# Patient Record
Sex: FEMALE | HISPANIC OR LATINO | URBAN - METROPOLITAN AREA
[De-identification: names, ages, dates, MRNs, and addresses within clinical notes are randomized per-mention and may not be internally consistent; named-entity substitution may affect disease eponyms.]

---

## 2021-12-29 ENCOUNTER — APPOINTMENT (RX ONLY)
Dept: URBAN - METROPOLITAN AREA CLINIC 27 | Facility: CLINIC | Age: 66
Setting detail: DERMATOLOGY
End: 2021-12-29

## 2021-12-29 PROBLEM — L50.1 IDIOPATHIC URTICARIA: Status: ACTIVE | Noted: 2021-12-29

## 2021-12-29 PROCEDURE — 99204 OFFICE O/P NEW MOD 45 MIN: CPT

## 2021-12-29 PROCEDURE — ? ADDITIONAL NOTES

## 2021-12-29 PROCEDURE — ? COUNSELING

## 2021-12-29 PROCEDURE — ? TREATMENT REGIMEN

## 2021-12-29 PROCEDURE — ? ORDER TESTS

## 2021-12-29 PROCEDURE — ? LAB REPORTS REVIEWED

## 2021-12-29 NOTE — PROCEDURE: MIPS QUALITY
Quality 431: Preventive Care And Screening: Unhealthy Alcohol Use - Screening: Patient not identified as an unhealthy alcohol user when screened for unhealthy alcohol use using a systematic screening method
Detail Level: Generalized
Quality 111:Pneumonia Vaccination Status For Older Adults: Pneumococcal Vaccination Previously Received
Quality 402: Tobacco Use And Help With Quitting Among Adolescents: Patient screened for tobacco and never smoked
Quality 130: Documentation Of Current Medications In The Medical Record: Current Medications Documented

## 2021-12-29 NOTE — PROCEDURE: ADDITIONAL NOTES
Render Risk Assessment In Note?: no
Additional Notes: Discussed urticaria and causes at length. Patient reports previous history of episodic urticaria in childhood and later in her 30’s, however those episodes only lasted weeks-months, current symptoms have been present for 5 years. Will check blood work and trial higher dose antihistamine.
Detail Level: Detailed

## 2021-12-29 NOTE — HPI: ITCHING
What Type Of Note Output Would You Prefer (Optional)?: Standard Output
On A Scale Of 0 To 10 How Would You Rate Your Itching?: 8
How Did Your Itching Occur?: gradual in onset  (over a period of years)
How Severe Is Your Itching?: moderate
Additional History: Pt reports pruritis is worse after showers and when clothing items rubs against her skin. This issue has been going on for five years. Pt reports she has seen multiple doctors and was prescribed antihistamines (Claritin and Allegra) and reports her itching is the same.

## 2021-12-29 NOTE — PROCEDURE: TREATMENT REGIMEN
Detail Level: Simple
Initiate Treatment: Allegra 180 mg tabs- take 1 tab BID, if no change in symptoms after 4-5 days, increase to TID. May increase to QID if still no change after 4-5 days.

## 2021-12-29 NOTE — PROCEDURE: ORDER TESTS
Performing Laboratory: [id number]
Billing Type: Third-Party Bill
Expected Date Of Service: 12/29/2021
Bill For Surgical Tray: no

## 2022-01-18 ENCOUNTER — APPOINTMENT (RX ONLY)
Dept: URBAN - METROPOLITAN AREA CLINIC 27 | Facility: CLINIC | Age: 67
Setting detail: DERMATOLOGY
End: 2022-01-18

## 2022-01-18 DIAGNOSIS — L71.8 OTHER ROSACEA: ICD-10-CM

## 2022-01-18 PROBLEM — L50.1 IDIOPATHIC URTICARIA: Status: ACTIVE | Noted: 2022-01-18

## 2022-01-18 PROCEDURE — ? COUNSELING

## 2022-01-18 PROCEDURE — ? DIAGNOSIS COMMENT

## 2022-01-18 PROCEDURE — ? TREATMENT REGIMEN

## 2022-01-18 PROCEDURE — ? PRESCRIPTION

## 2022-01-18 PROCEDURE — ? ADDITIONAL NOTES

## 2022-01-18 PROCEDURE — 99214 OFFICE O/P EST MOD 30 MIN: CPT

## 2022-01-18 RX ORDER — FEXOFENADINE 180 MG/1
TABLET, FILM COATED ORAL
Qty: 120 | Refills: 3 | Status: ERX | COMMUNITY
Start: 2022-01-18

## 2022-01-18 RX ADMIN — FEXOFENADINE: 180 TABLET, FILM COATED ORAL at 00:00

## 2022-01-18 ASSESSMENT — LOCATION ZONE DERM: LOCATION ZONE: FACE

## 2022-01-18 ASSESSMENT — LOCATION SIMPLE DESCRIPTION DERM: LOCATION SIMPLE: RIGHT CHEEK

## 2022-01-18 ASSESSMENT — LOCATION DETAILED DESCRIPTION DERM: LOCATION DETAILED: RIGHT MEDIAL MALAR CHEEK

## 2022-01-18 NOTE — PROCEDURE: ADDITIONAL NOTES
Render Risk Assessment In Note?: no
Additional Notes: Discussed blood work was unremarkable. Based on symptoms and history, chronic idiopathic urticaria is most most likely diagnosis. Will re-eval in 3 weeks and consider referral to allergist or type 2 antihistamine in addition to allegra.
Detail Level: Detailed

## 2022-01-18 NOTE — PROCEDURE: TREATMENT REGIMEN
Plan: If uncontrolled with QID dosing of type 1 antihistamine, will consider adding type 2 antihistamine.
Modify Regimen: Allegra 180 mg - increase to taking 1 tab TID, if symptoms are not relieved in 4-5 days increase to QID.
Detail Level: Simple
Plan: Patient reports improvement from gentle skin care and moisturization, declines treatment today. Patient will follow up if uncontrolled or worsens.

## 2022-01-18 NOTE — PROCEDURE: DIAGNOSIS COMMENT
Detail Level: Simple
Render Risk Assessment In Note?: no
Comment: Favor Chronic Idiopathic Urticaria

## 2022-01-25 ENCOUNTER — RX ONLY (OUTPATIENT)
Age: 67
Setting detail: RX ONLY
End: 2022-01-25

## 2022-01-25 RX ORDER — MONTELUKAST SODIUM 10 MG/1
TABLET, FILM COATED ORAL
Qty: 30 | Refills: 3 | Status: ERX | COMMUNITY
Start: 2022-01-25

## 2022-01-25 RX ORDER — FEXOFENADINE 180 MG/1
TABLET, FILM COATED ORAL
Qty: 60 | Refills: 3 | Status: ERX